# Patient Record
Sex: MALE | Race: WHITE | Employment: UNEMPLOYED | URBAN - METROPOLITAN AREA
[De-identification: names, ages, dates, MRNs, and addresses within clinical notes are randomized per-mention and may not be internally consistent; named-entity substitution may affect disease eponyms.]

---

## 2020-08-16 ENCOUNTER — APPOINTMENT (OUTPATIENT)
Dept: ULTRASOUND IMAGING | Facility: CLINIC | Age: 40
End: 2020-08-16
Attending: EMERGENCY MEDICINE
Payer: MEDICARE

## 2020-08-16 ENCOUNTER — HOSPITAL ENCOUNTER (EMERGENCY)
Facility: CLINIC | Age: 40
Discharge: HOME | End: 2020-08-16
Attending: EMERGENCY MEDICINE | Admitting: EMERGENCY MEDICINE
Payer: MEDICARE

## 2020-08-16 VITALS
OXYGEN SATURATION: 96 % | TEMPERATURE: 97.8 F | SYSTOLIC BLOOD PRESSURE: 123 MMHG | HEIGHT: 70 IN | HEART RATE: 94 BPM | RESPIRATION RATE: 18 BRPM | DIASTOLIC BLOOD PRESSURE: 79 MMHG

## 2020-08-16 DIAGNOSIS — N45.1 ACUTE EPIDIDYMITIS: ICD-10-CM

## 2020-08-16 PROCEDURE — 93976 VASCULAR STUDY: CPT

## 2020-08-16 PROCEDURE — 25000132 ZZH RX MED GY IP 250 OP 250 PS 637: Mod: GY | Performed by: EMERGENCY MEDICINE

## 2020-08-16 PROCEDURE — 25000128 H RX IP 250 OP 636: Performed by: EMERGENCY MEDICINE

## 2020-08-16 PROCEDURE — 99285 EMERGENCY DEPT VISIT HI MDM: CPT | Mod: 25

## 2020-08-16 PROCEDURE — 96372 THER/PROPH/DIAG INJ SC/IM: CPT

## 2020-08-16 PROCEDURE — 25000125 ZZHC RX 250: Performed by: EMERGENCY MEDICINE

## 2020-08-16 PROCEDURE — 87491 CHLMYD TRACH DNA AMP PROBE: CPT | Performed by: EMERGENCY MEDICINE

## 2020-08-16 PROCEDURE — 87591 N.GONORRHOEAE DNA AMP PROB: CPT | Performed by: EMERGENCY MEDICINE

## 2020-08-16 RX ORDER — AZITHROMYCIN 250 MG/1
1000 TABLET, FILM COATED ORAL ONCE
Status: COMPLETED | OUTPATIENT
Start: 2020-08-16 | End: 2020-08-16

## 2020-08-16 RX ORDER — SULFAMETHOXAZOLE/TRIMETHOPRIM 800-160 MG
1 TABLET ORAL 2 TIMES DAILY
Qty: 20 TABLET | Refills: 0 | Status: SHIPPED | OUTPATIENT
Start: 2020-08-16 | End: 2020-08-26

## 2020-08-16 RX ADMIN — AZITHROMYCIN MONOHYDRATE 1000 MG: 250 TABLET ORAL at 20:28

## 2020-08-16 RX ADMIN — LIDOCAINE HYDROCHLORIDE 250 MG: 10 INJECTION, SOLUTION EPIDURAL; INFILTRATION; INTRACAUDAL; PERINEURAL at 20:31

## 2020-08-16 NOTE — ED AVS SNAPSHOT
Emergency Department  [address] [place] [address]  Phone:  [phone]  Fax:  [phone]                                    Vini Calderón   MRN: [id number]    Department:   Emergency Department   Date of Visit:  8/16/2020           After Visit Summary Signature Page    I have received my discharge instructions, and my questions have been answered. I have discussed any challenges I see with this plan with the nurse or doctor.    ..........................................................................................................................................  Patient/Patient Representative Signature      ..........................................................................................................................................  Patient Representative Print Name and Relationship to Patient    ..................................................               ................................................  Date                                   Time    ..........................................................................................................................................  Reviewed by Signature/Title    ...................................................              ..............................................  Date                                               Time          22EPIC Rev 08/18

## 2020-08-17 LAB
C TRACH DNA SPEC QL NAA+PROBE: NEGATIVE
N GONORRHOEA DNA SPEC QL NAA+PROBE: NEGATIVE
SPECIMEN SOURCE: NORMAL
SPECIMEN SOURCE: NORMAL

## 2020-08-17 NOTE — ED PROVIDER NOTES
History     Chief Complaint:    Testicular/scrotal Pain      HPI   Vini Calderón is a 39 year old male who presents with right testicular pain and tenderness reminiscent of epididymitis.  He has a history of gonorrhea, rectal, that was treated this packs week with Rocephin and azithromycin.  He is currently in [facility] for rehab.  He notes masturbating today in the shower and noted seeing blood and having right testicular pain.  He is currently on HIV medications.  He has a history of tendon rupture from Cipro.  He denies fevers or chills.  He denies penile discharge.  Past sexual encounter was approximately a month ago.    Allergies:    Allergies   Allergen Reactions     Ciprofloxacin Other (See Comments)     Tendons rupture  Tendinitis, myalgia  Tendonitis & rupture of tendons       Flucloxacillin Other (See Comments)     Tendonitis, myalgia        Medications:    albuterol (VENTOLIN HFA;PROVENTIL HFA;PROAIR) 108 (90 BASE) MCG/ACT inhalation inhaler   Inhale 2 puffs.   0 04/12/2017   Active   Cholecalciferol (VITAMIN D) 2000 UNITS oral tablet tablet   Take 2,000 UNITS by mouth.   0     Active   EPINEPHrine (EPIPEN / AUVI-Q) 0.3 mg/0.3 mL injection   Inject 0.3 mg into a muscle.   0 05/12/2017   Active   fluticasone propionate (FLONASE) 50 mcg/act nasal suspension   2 sprays by Nasal route.   0 12/30/2016   Active   GABApentin (NEURONTIN) 100 mg oral capsule   Start by taking 1 capsule (100mg) 3 times daily. Can increase by 1 capsule (100mg) 3 times daily every week up to 3 capsules (300mg) 3 times daily as directed/tolerated. 180 capsule   3 12/28/2017   Active   respiratory therapy supplies NotApplicabl kit   Use nebulizer for nebulized medications as prescribed 1 kit   3 11/20/2018   Active   prazosin (MINIPRESS) 2 mg oral capsule   1 cap PRN   0 04/05/2016   Active   hydrocortisone 2.5% externally cream   Apply to rash twice daily as needed 45 g   2 03/20/2019   Active   budesonide-formoterol  (SYMBICORT) 160-4.5 mcg/puff inhalation aerosol   Inhale 2 puffs.   0 04/17/2019   Active   albuterol-ipratropium (DUONEB) 2.5-0.5 mg/3 mL inhalation solution vial   Inhale 3 mL.   0 04/17/2019   Active   montelukast (SINGULAIR) 10 mg oral tablet   Take 10 mg by mouth.   0 04/17/2019   Active   LORazepam (ATIVAN) 0.5 mg oral tablet    Indications: Anxiety TAKE 1 TABLET BY MOUTH DAILY AS NEEDED FOR ANXIETY 30 tablet   0 06/12/2020   Active   BIKTARVY -25 MG oral tablet   TAKE 1 TABLET BY MOUTH DAILY 30 tablet   3 07/14/2020   Active   cetirizine (ZYRTEC) 10 mg oral tablet    Indications: Acute Urticaria             Problem List:    PTSD (post-traumatic stress disorder) 12/08/2017   Methamphetamine use 11/15/2017   Last Assessment & Plan:     Currently in treatment at [facility]. Request for ativan declined.      Severe persistent asthma 11/15/2017   Overview:     Followed by Dr. Woods, [facility]     HIV positive     Last Assessment & Plan:     Doing well on ARVs, continue Biktarvy. Labs reviewed with patient. Vaccines up to date.  STD screen today per patient request (asymptomatic).   Plans on going for COVID testing at [facility].      Anal dysplasia     Overview:     1/3/2017 anal pap in [facility] with AIN3  1/18/2018 anal pap negative but visual exam with changes and TCAA to abnormalities.   4/12/2018 anal pap negative but visual exam with changes and TCAA to abnormalities.  8/8/2019: cytology ASCUS, HPV+, visual exam with changes and TCAA to abnormalities. F/u in 1 year.        Resolved:    Jaw pain 05/19/2020 07/08/2020   Dental caries 08/06/2019 12/11/2019   Acute deep vein thrombosis (DVT) of distal vein of left lower extremity 07/15/2019 07/15/2019   Deep vein thrombosis (DVT) of distal vein of lower extremity 05/29/2019 02/21/2020   Suicide attempt 01/02/2019 02/28/2019   Overview:     Overview:   trazodone overdose  Overview:   trazodone overdose  Overview:   trazodone overdose      Rash 01/02/2019 05/29/2019   Last Assessment & Plan:     Worsening despite treatment with both anti-fungal and steroid creams. RPR today. Refer to Dermatology.      Herpes simplex 01/02/2017 12/12/2017   HIV positive 12/07/2016 11/15/2017   History of hepatitis C 04/06/2016 12/12/2017   Overview:     Overview:   Acute hepatitis C, genotype 1, treated with boceprevir, and peg-interferon/ribavirin (x 48 weeks).  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 He was treated initially with ampicillin, but the Shigella was demonstrated to be ampicillin resistant. He then had his antibiotic change, though he is unsure what that antibiotic was. He has severe intolerance of quinolone antibiotics. He reports that he continues to have some abdominal cramping but minimal diarrhea at this point.     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 No pertinent past medical history.    Past Surgical History:      History reviewed. No pertinent surgical history.    Family History:      History reviewed. No pertinent family history.    Social History:    Marital Status:    Social History     Tobacco Use     Smoking status: Current Every Day Smoker     Smokeless tobacco: Never Used   Substance Use Topics     Alcohol use: None     Drug use: Yes     Types: Marijuana, Methamphetamines        Review of Systems  + Right testicular pain, blood with ejaculation  Negative for fevers chills vomiting abdominal pain diarrhea or constipation    All other systems negative    Physical Exam  "  First Vitals:  BP: 123/79  Pulse: 94  Temp: 97.8  F (36.6  C)  Resp: 18  Height: 177.8 cm (5' 10\")  SpO2: 96 %      Physical Exam  GENERAL: well developed, pleasant  HEAD: atraumatic  EYES: pupils reactive, extraocular muscles intact, conjunctivae normal  ENT:  mucus membranes moist  NECK:  trachea midline, normal range of motion  RESPIRATORY: no tachypnea, breath sounds clear to auscultation   CVS: normal S1/S2, no murmurs, intact distal pulses  ABDOMEN: soft, nontender, nondistention  .  Mild tenderness to the right epididymis on the right scrotum, no chancre  MUSCULOSKELETAL: no deformities  SKIN: warm and dry, no acute rashes or ulceration  NEURO: GCS 15, cranial nerves intact, alert and oriented x3  PSYCH:  Mood/affect normal        Emergency Department Course       Imaging:  US Testicular & Scrotum w Doppler Ltd   Final Result   IMPRESSION:    1. Areas of decreased echotexture within both testicles, question   areas of orchitis.   2. Left varicocele.      ABDIRIZAK OZUNA MD            Laboratory:  Labs Ordered and Resulted from Time of ED Arrival Up to the Time of Departure from the ED   NEISSERIA GONORRHOEAE PCR   CHLAMYDIA TRACHOMATIS PCR         Procedures:  Medications   cefTRIAXone (ROCEPHIN) 250 mg in lidocaine (PF) (XYLOCAINE) 1 % injection (250 mg Intramuscular Given 8/16/20 2031)   azithromycin (ZITHROMAX) tablet 1,000 mg (1,000 mg Oral Given 8/16/20 2028)           Impression & Plan            CMS Diagnoses:        Medical Decision Making:  Patient presents with testicular pain on the right.  He has had epididymitis in the past.  He has had tendon rupture from fluoroquinolones.  He recently tested positive for gonorrhea from his rectum and was treated with Rocephin and Zithromax.  Certainly STDs are high in the differential.  No evidence of torsion or tumor per ultrasound.  He was given repeat dosing of Rocephin and Zithromax.  Did speak with her clinical pharmacist who agreed with the above " treatment as well as Bactrim for 10 days.        Diagnosis:    ICD-10-CM    1. Acute epididymitis  N45.1        Disposition:  discharged to home    Discharge Medications:  Discharge Medication List as of 8/16/2020  9:28 PM      START taking these medications    Details   sulfamethoxazole-trimethoprim (BACTRIM DS) 800-160 MG tablet Take 1 tablet by mouth 2 times daily for 10 days, Disp-20 tablet,R-0, Local Print             Refugio Laws MD  8/16/2020    EMERGENCY DEPARTMENT       Refugio Laws MD  08/16/20 [id number]

## 2021-01-04 ENCOUNTER — HEALTH MAINTENANCE LETTER (OUTPATIENT)
Age: 41
End: 2021-01-04

## 2021-04-26 ENCOUNTER — COMMUNICATION - HEALTHEAST (OUTPATIENT)
Dept: CARDIOLOGY | Facility: CLINIC | Age: 41
End: 2021-04-26

## 2021-10-11 ENCOUNTER — HEALTH MAINTENANCE LETTER (OUTPATIENT)
Age: 41
End: 2021-10-11

## 2022-01-30 ENCOUNTER — HEALTH MAINTENANCE LETTER (OUTPATIENT)
Age: 42
End: 2022-01-30

## 2022-09-24 ENCOUNTER — HEALTH MAINTENANCE LETTER (OUTPATIENT)
Age: 42
End: 2022-09-24

## 2023-05-08 ENCOUNTER — HEALTH MAINTENANCE LETTER (OUTPATIENT)
Age: 43
End: 2023-05-08